# Patient Record
Sex: MALE | Race: WHITE
[De-identification: names, ages, dates, MRNs, and addresses within clinical notes are randomized per-mention and may not be internally consistent; named-entity substitution may affect disease eponyms.]

---

## 2020-07-26 ENCOUNTER — HOSPITAL ENCOUNTER (INPATIENT)
Dept: HOSPITAL 75 - ER | Age: 55
LOS: 1 days | Discharge: HOME | DRG: 282 | End: 2020-07-27
Attending: INTERNAL MEDICINE | Admitting: INTERNAL MEDICINE
Payer: COMMERCIAL

## 2020-07-26 VITALS — BODY MASS INDEX: 23.77 KG/M2 | HEIGHT: 70.08 IN | WEIGHT: 166.01 LBS

## 2020-07-26 VITALS — SYSTOLIC BLOOD PRESSURE: 141 MMHG | DIASTOLIC BLOOD PRESSURE: 92 MMHG

## 2020-07-26 VITALS — SYSTOLIC BLOOD PRESSURE: 157 MMHG | DIASTOLIC BLOOD PRESSURE: 96 MMHG

## 2020-07-26 VITALS — DIASTOLIC BLOOD PRESSURE: 111 MMHG | SYSTOLIC BLOOD PRESSURE: 151 MMHG

## 2020-07-26 VITALS — DIASTOLIC BLOOD PRESSURE: 63 MMHG | SYSTOLIC BLOOD PRESSURE: 110 MMHG

## 2020-07-26 VITALS — DIASTOLIC BLOOD PRESSURE: 74 MMHG | SYSTOLIC BLOOD PRESSURE: 126 MMHG

## 2020-07-26 VITALS — DIASTOLIC BLOOD PRESSURE: 68 MMHG | SYSTOLIC BLOOD PRESSURE: 104 MMHG

## 2020-07-26 VITALS — SYSTOLIC BLOOD PRESSURE: 139 MMHG | DIASTOLIC BLOOD PRESSURE: 87 MMHG

## 2020-07-26 DIAGNOSIS — R00.1: ICD-10-CM

## 2020-07-26 DIAGNOSIS — I21.4: Primary | ICD-10-CM

## 2020-07-26 DIAGNOSIS — Z87.891: ICD-10-CM

## 2020-07-26 LAB
ALBUMIN SERPL-MCNC: 3.9 GM/DL (ref 3.2–4.5)
ALP SERPL-CCNC: 73 U/L (ref 40–136)
ALT SERPL-CCNC: 15 U/L (ref 0–55)
APTT BLD: 29 SEC (ref 24–35)
BASOPHILS # BLD AUTO: 0.1 10^3/UL (ref 0–0.1)
BASOPHILS NFR BLD AUTO: 1 % (ref 0–10)
BILIRUB SERPL-MCNC: 0.2 MG/DL (ref 0.1–1)
BUN/CREAT SERPL: 16
CALCIUM SERPL-MCNC: 8.8 MG/DL (ref 8.5–10.1)
CHLORIDE SERPL-SCNC: 104 MMOL/L (ref 98–107)
CO2 SERPL-SCNC: 19 MMOL/L (ref 21–32)
CREAT SERPL-MCNC: 1.06 MG/DL (ref 0.6–1.3)
EOSINOPHIL # BLD AUTO: 0.1 10^3/UL (ref 0–0.3)
EOSINOPHIL NFR BLD AUTO: 1 % (ref 0–10)
ERYTHROCYTE [DISTWIDTH] IN BLOOD BY AUTOMATED COUNT: 13.3 % (ref 10–14.5)
GFR SERPLBLD BASED ON 1.73 SQ M-ARVRAT: > 60 ML/MIN
GLUCOSE SERPL-MCNC: 111 MG/DL (ref 70–105)
HCT VFR BLD CALC: 44 % (ref 40–54)
HGB BLD-MCNC: 15.7 G/DL (ref 13.3–17.7)
INR PPP: 0.9 (ref 0.8–1.4)
LYMPHOCYTES # BLD AUTO: 5.6 X 10^3 (ref 1–4)
LYMPHOCYTES NFR BLD AUTO: 60 % (ref 12–44)
MAGNESIUM SERPL-MCNC: 1.9 MG/DL (ref 1.6–2.4)
MANUAL DIFFERENTIAL PERFORMED BLD QL: NO
MCH RBC QN AUTO: 31 PG (ref 25–34)
MCHC RBC AUTO-ENTMCNC: 35 G/DL (ref 32–36)
MCV RBC AUTO: 87 FL (ref 80–99)
MONOCYTES # BLD AUTO: 0.4 X 10^3 (ref 0–1)
MONOCYTES NFR BLD AUTO: 5 % (ref 0–12)
NEUTROPHILS # BLD AUTO: 3.1 X 10^3 (ref 1.8–7.8)
NEUTROPHILS NFR BLD AUTO: 34 % (ref 42–75)
PLATELET # BLD: 180 10^3/UL (ref 130–400)
PMV BLD AUTO: 11.6 FL (ref 7.4–10.4)
POTASSIUM SERPL-SCNC: 4.4 MMOL/L (ref 3.6–5)
PROT SERPL-MCNC: 6.4 GM/DL (ref 6.4–8.2)
PROTHROMBIN TIME: 12.2 SEC (ref 12.2–14.7)
SODIUM SERPL-SCNC: 134 MMOL/L (ref 135–145)
WBC # BLD AUTO: 9.3 10^3/UL (ref 4.3–11)

## 2020-07-26 PROCEDURE — 83874 ASSAY OF MYOGLOBIN: CPT

## 2020-07-26 PROCEDURE — 93041 RHYTHM ECG TRACING: CPT

## 2020-07-26 PROCEDURE — 85610 PROTHROMBIN TIME: CPT

## 2020-07-26 PROCEDURE — 36415 COLL VENOUS BLD VENIPUNCTURE: CPT

## 2020-07-26 PROCEDURE — 85379 FIBRIN DEGRADATION QUANT: CPT

## 2020-07-26 PROCEDURE — 85730 THROMBOPLASTIN TIME PARTIAL: CPT

## 2020-07-26 PROCEDURE — 71045 X-RAY EXAM CHEST 1 VIEW: CPT

## 2020-07-26 PROCEDURE — 80048 BASIC METABOLIC PNL TOTAL CA: CPT

## 2020-07-26 PROCEDURE — 84484 ASSAY OF TROPONIN QUANT: CPT

## 2020-07-26 PROCEDURE — 83880 ASSAY OF NATRIURETIC PEPTIDE: CPT

## 2020-07-26 PROCEDURE — 93005 ELECTROCARDIOGRAM TRACING: CPT

## 2020-07-26 PROCEDURE — 80061 LIPID PANEL: CPT

## 2020-07-26 PROCEDURE — 85025 COMPLETE CBC W/AUTO DIFF WBC: CPT

## 2020-07-26 PROCEDURE — 83735 ASSAY OF MAGNESIUM: CPT

## 2020-07-26 PROCEDURE — 80053 COMPREHEN METABOLIC PANEL: CPT

## 2020-07-26 RX ADMIN — NITROGLYCERIN SCH INCH: 20 OINTMENT TOPICAL at 23:00

## 2020-07-26 RX ADMIN — SODIUM CHLORIDE SCH MLS/HR: 900 INJECTION, SOLUTION INTRAVENOUS at 21:15

## 2020-07-26 NOTE — ED GENERAL
General


Stated Complaint:  CP/L ARM PAIN/COLD SWEATS


Source of Information:  Patient


Exam Limitations:  No Limitations





History of Present Illness


Date Seen by Provider:  Jul 26, 2020


Time Seen by Provider:  17:13


Initial Comments


To ER with left upper chest pain sharp in nature that radiates down the left 

arm. No cough. No fevers. He does have shortness of breath that also started 1 

hour ago. His symptoms began while he was at rest getting ready to go to work at

[employer].  He has no fevers or cough or headache. He does not smoke.


Timing/Duration:  1-2 Days


Severity:  Moderate


Associated Systoms:  Denies Symptoms





Allergies and Home Medications


Allergies


Coded Allergies:  


     No Known Drug Allergies (Unverified , 7/26/20)





Patient Home Medication List


Home Medication List Reviewed:  Yes





Review of Systems


Review of Systems


Constitutional:  see HPI; No chills, No fever


EENTM:  see HPI


Respiratory:  see HPI, short of breath


Cardiovascular:  see HPI, chest pain


Genitourinary:  no symptoms reported


Musculoskeletal:  no symptoms reported


Skin:  no symptoms reported


Psychiatric/Neurological:  No Symptoms Reported


Hematologic/Lymphatic:  No Symptoms Reported


Immunological/Allergic:  no symptoms reported





Past Medical-Social-Family Hx


Patient Social History


Recent Foreign Travel:  No


Contact w/Someone Who Travel:  No





Physical Exam


Vital Signs





Vital Signs - First Documented








 7/26/20





 17:18


 


Temp 36.8


 


Pulse 52


 


Resp 18


 


B/P (MAP) 148/94 (112)


 


Pulse Ox 98


 


O2 Delivery Room Air





Capillary Refill :


Height, Weight, BMI


Height: '"


Weight: lbs. oz. kg;  BMI


Method:


General Appearance:  No Apparent Distress, WD/WN


Eyes:  Bilateral Eye Normal Inspection, Bilateral Eye PERRL, Bilateral Eye EOMI


HEENT:  PERRL/EOMI, TMs Normal


Neck:  Full Range of Motion, Normal Inspection


Respiratory:  Lungs Clear, Normal Breath Sounds, No Accessory Muscle Use, No 

Respiratory Distress


Cardiovascular:  Regular Rate, Rhythm, Normal Peripheral Pulses


Gastrointestinal:  Normal Bowel Sounds, Non Tender, Soft


Extremity:  Normal Capillary Refill, Normal Inspection


Neurologic/Psychiatric:  Alert, Oriented x3


Skin:  Normal Color, Warm/Dry





Progress/Results/Core Measures


Suspected Sepsis


SIRS


Temperature: 


Pulse:  


Respiratory Rate: 


 


Laboratory Tests


7/26/20 17:25: White Blood Count 9.3


Blood Pressure  / 


Mean: 


 





Laboratory Tests


7/26/20 17:25: 


Creatinine 1.06, INR Comment 0.9, Platelet Count 180, Total Bilirubin 0.2








Results/Orders


Lab Results





Laboratory Tests








Test


 7/26/20


17:25 7/26/20


19:20 Range/Units


 


 


White Blood Count


 9.3 


 


 4.3-11.0


10^3/uL


 


Red Blood Count


 5.07 


 


 4.35-5.85


10^6/uL


 


Hemoglobin 15.7   13.3-17.7  G/DL


 


Hematocrit 44   40-54  %


 


Mean Corpuscular Volume 87   80-99  FL


 


Mean Corpuscular Hemoglobin 31   25-34  PG


 


Mean Corpuscular Hemoglobin


Concent 35 


 


 32-36  G/DL





 


Red Cell Distribution Width 13.3   10.0-14.5  %


 


Platelet Count


 180 


 


 130-400


10^3/uL


 


Mean Platelet Volume 11.6 H  7.4-10.4  FL


 


Neutrophils (%) (Auto) 34 L  42-75  %


 


Lymphocytes (%) (Auto) 60 H  12-44  %


 


Monocytes (%) (Auto) 5   0-12  %


 


Eosinophils (%) (Auto) 1   0-10  %


 


Basophils (%) (Auto) 1   0-10  %


 


Neutrophils # (Auto) 3.1   1.8-7.8  X 10^3


 


Lymphocytes # (Auto) 5.6 H  1.0-4.0  X 10^3


 


Monocytes # (Auto) 0.4   0.0-1.0  X 10^3


 


Eosinophils # (Auto)


 0.1 


 


 0.0-0.3


10^3/uL


 


Basophils # (Auto)


 0.1 


 


 0.0-0.1


10^3/uL


 


Prothrombin Time 12.2   12.2-14.7  SEC


 


INR Comment 0.9   0.8-1.4  


 


Activated Partial


Thromboplast Time 29 


 


 24-35  SEC





 


D-Dimer


 0.56 H


 


 0.00-0.49


UG/ML


 


Sodium Level 134 L  135-145  MMOL/L


 


Potassium Level 4.4   3.6-5.0  MMOL/L


 


Chloride Level 104     MMOL/L


 


Carbon Dioxide Level 19 L  21-32  MMOL/L


 


Anion Gap 11   5-14  MMOL/L


 


Blood Urea Nitrogen 17   7-18  MG/DL


 


Creatinine


 1.06 


 


 0.60-1.30


MG/DL


 


Estimat Glomerular Filtration


Rate > 60 


 


  





 


BUN/Creatinine Ratio 16    


 


Glucose Level 111 H    MG/DL


 


Calcium Level 8.8   8.5-10.1  MG/DL


 


Corrected Calcium 8.9   8.5-10.1  MG/DL


 


Magnesium Level 1.9   1.6-2.4  MG/DL


 


Total Bilirubin 0.2   0.1-1.0  MG/DL


 


Aspartate Amino Transf


(AST/SGOT) 21 


 


 5-34  U/L





 


Alanine Aminotransferase


(ALT/SGPT) 15 


 


 0-55  U/L





 


Alkaline Phosphatase 73     U/L


 


Myoglobin


 84.5 


 


 10.0-92.0


NG/ML


 


Troponin I < 0.028  0.128 H <0.028  NG/ML


 


B-Type Natriuretic Peptide 29.5   <100.0  PG/ML


 


Total Protein 6.4   6.4-8.2  GM/DL


 


Albumin 3.9   3.2-4.5  GM/DL








My Orders





Orders - JAMEY BRAGG APRN


Cbc With Automated Diff (7/26/20 17:10)


Magnesium (7/26/20 17:10)


Chest 1 View, Ap/Pa Only (7/26/20 17:10)


Ekg Tracing (7/26/20 17:10)


Comprehensive Metabolic Panel (7/26/20 17:10)


Myoglobin Serum (7/26/20 17:10)


Protime With Inr (7/26/20 17:10)


Partial Thromboplastin Time (7/26/20 17:10)


O2 (7/26/20 17:10)


Monitor-Rhythm Ecg Trace Only (7/26/20 17:10)


Lipid Panel (7/27/20 06:00)


Ed Iv/Invasive Line Start (7/26/20 17:10)


BNP (7/26/20 17:10)


Fibrin Degradation Products (7/26/20 17:10)


Troponin I (7/26/20 17:10)


Aspirin Chewable Tablet (Baby Aspirin Ch (7/26/20 17:15)


Ketorolac Injection (Toradol Injection) (7/26/20 17:15)


Troponin I (7/26/20 19:12)


Enoxaparin Injection (Lovenox Injection) (7/26/20 20:15)


Clopidogrel Tablet (Plavix Tablet) (7/26/20 20:15)


Atorvastatin Tablet (Lipitor Tablet) (7/26/20 21:00)





Medications Given in ED





Current Medications








 Medications  Dose


 Ordered  Sig/Jeffry


 Route  Start Time


 Stop Time Status Last Admin


Dose Admin


 


 Aspirin  324 mg  ONCE  ONCE


 PO  7/26/20 17:15


 7/26/20 17:16 DC 7/26/20 17:36


324 MG


 


 Ketorolac


 Tromethamine  15 mg  ONCE  ONCE


 IVP  7/26/20 17:15


 7/26/20 17:16 DC 7/26/20 17:36


15 MG








Vital Signs/I&O











 7/26/20





 17:18


 


Temp 36.8


 


Pulse 52


 


Resp 18


 


B/P (MAP) 148/94 (112)


 


Pulse Ox 98


 


O2 Delivery Room Air





Capillary Refill :





Departure


Communication (Admissions)


Time/Spoke to Admitting Phy:  20:06


2006-discussed with Dr. Shields and Dr. Law, we will admit use Lovenox 

treatment dose, aspirin, Plavix bolus, statin.


1945-he is chest pain-free as he has been since receiving the Toradol. We are 

awaiting repeat troponin to come back.


2006-still completely pain-free. However his second troponin came back little 

elevated.





Impression





   Primary Impression:  


   Chest pain


   Qualified Codes:  R07.9 - Chest pain, unspecified


Disposition:  09 ADMITTED AS INPATIENT


Condition:  Stable





Admissions


Decision to Admit Reason:  Admit from ER (General)


Decision to Admit/Date:  Jul 26, 2020


Time/Decision to Admit Time:  20:06





Departure-Patient Inst.


Decision time for Depature:  19:45


Referrals:  


NO,LOCAL PHYSICIAN (PCP/Family)


Primary Care Physician


Patient Instructions:  Chest Pain (DC)





Add. Discharge Instructions:  


Follow-up with your doctor later this week for recheck. Return to ER for any 

worsening.











JAMEY BRAGG             Jul 26, 2020 17:15

## 2020-07-26 NOTE — XMS REPORT
Continuity of Care Document

                             Created on: 2020



SHAKIRA TREMAINE

External Reference #: [id number]

: 1965

Sex: Male



Demographics





                          Home Phone                [phone]

 

                          Preferred Language        Unknown

 

                          Marital Status            Unknown

 

                          Alevism Affiliation     Unknown

 

                          Race                      Unknown

 

                          Ethnic Group              Unknown





Author





                          Organization              Unknown

 

                          Address                   Unknown

 

                          Phone                     Unavailable



              



Allergies

      



There is no data.                  



Medications

      



There is no data.                  



Problems

      



There is no data.                  



Procedures

      



There is no data.                  



Results

      



                    Test                Result              Range        

 

                                        Complete blood count (CBC) with automate

d white blood cell (WBC) differential - 

20 17:25         

 

                          Blood leukocytes automated count (number/volume)      

     9.3 10*3/uL          

                                        4.3-11.0        

 

                          Blood erythrocytes automated count (number/volume)    

       5.07 10*6/uL       

                                        4.35-5.85        

 

                    Venous blood hemoglobin measurement (mass/volume)           

15.7 g/dL           

13.3-17.7        

 

                    Blood hematocrit (volume fraction)           44 %           

     40-54        

 

                    Automated erythrocyte mean corpuscular volume           87 [

foz_us]           

80-99        

 

                                        Automated erythrocyte mean corpuscular h

emoglobin (mass per erythrocyte)        

                          31 pg                     25-34        

 

                                        Automated erythrocyte mean corpuscular h

emoglobin concentration measurement 

(mass/volume)             35 g/dL                   32-36        

 

                    Automated erythrocyte distribution width ratio           13.

3 %              10.0-

14.5        

 

                    Automated blood platelet count (count/volume)           180 

10*3/uL           

130-400        

 

                          Automated blood platelet mean volume measurement      

     11.6 [foz_us]        

                                        7.4-10.4        

 

                    Automated blood neutrophils/100 leukocytes           34 %   

             42-75       

 

 

                    Automated blood lymphocytes/100 leukocytes           60 %   

             12-44       

 

 

                    Blood monocytes/100 leukocytes           5 %                

 0-12        

 

                    Automated blood eosinophils/100 leukocytes           1 %    

             0-10        

 

                    Automated blood basophils/100 leukocytes           1 %      

           0-10        

 

                    Blood neutrophils automated count (number/volume)           

3.1 10*3            

1.8-7.8        

 

                    Blood lymphocytes automated count (number/volume)           

5.6 10*3            

1.0-4.0        

 

                    Blood monocytes automated count (number/volume)           0.

4 10*3            

0.0-1.0        

 

                    Automated eosinophil count           0.1 10*3/uL           0

.0-0.3        

 

                    Automated blood basophil count (count/volume)           0.1 

10*3/uL           

0.0-0.1        

 

                                        PT panel in platelet poor plasma by coag

ulation assay - 20 17:25         

 

                          Prothrombin time (PT) in platelet poor plasma by coagu

lation assay           

12.2 s                                  12.2-14.7        

 

                          INR in platelet poor plasma or blood by coagulation as

say           0.9         

                                        0.8-1.4        

 

                                        Activated partial thromboplastin time (a

PTT) in platelet poor plasma 

bycoagulation assay - 20 17:25         

 

                                        Activated partial thromboplastin time (a

PTT) in platelet poor plasma 

bycoagulation assay           29 s                      24-35        

 

                                        Comprehensive metabolic panel - 20

 17:25         

 

                          Serum or plasma sodium measurement (moles/volume)     

      134 mmol/L          

                                        135-145        

 

                          Serum or plasma potassium measurement (moles/volume)  

         4.4 mmol/L       

                                        3.6-5.0        

 

                          Serum or plasma chloride measurement (moles/volume)   

        104 mmol/L        

                                                

 

                    Carbon dioxide           19 mmol/L           21-32        

 

                          Serum or plasma anion gap determination (moles/volume)

           11 mmol/L      

                                        5-14        

 

                          Serum or plasma urea nitrogen measurement (mass/volume

)           17 mg/dL      

                                        7-18        

 

                          Serum or plasma creatinine measurement (mass/volume)  

         1.06 mg/dL       

                                        0.60-1.30        

 

                    Serum or plasma urea nitrogen/creatinine mass ratio         

  16                  NRG 

       

 

                                        Serum or plasma creatinine measurement w

ith calculation of estimated glomerular 

filtration rate           >                         NRG        

 

                    Serum or plasma glucose measurement (mass/volume)           

111 mg/dL           

        

 

                    Serum or plasma calcium measurement (mass/volume)           

8.8 mg/dL           

8.5-10.1        

 

                          Serum or plasma total bilirubin measurement (mass/volu

me)           0.2 mg/dL   

                                        0.1-1.0        

 

                                        Serum or plasma alkaline phosphatase tasha

surement (enzymatic activity/volume)    

                          73 U/L                            

 

                                        Serum or plasma aspartate aminotransfera

se measurement (enzymatic 

activity/volume)           21 U/L                    5-34        

 

                                        Serum or plasma alanine aminotransferase

 measurement (enzymatic activity/volume)

                          15 U/L                    0-55        

 

                    Serum or plasma protein measurement (mass/volume)           

6.4 g/dL            

6.4-8.2        

 

                    Serum or plasma albumin measurement (mass/volume)           

3.9 g/dL            

3.2-4.5        

 

                    CALCIUM CORRECTED           8.9 mg/dL           8.5-10.1    

    

 

                                        Fibrin D-dimer FEU measurement in platel

et poor plasma (mass/volume) - 20 

17:25         

 

                          Fibrin D-dimer FEU measurement in platelet poor plasma

 (mass/volume)           

0.56 ug/mL                              0.00-0.49        

 

                                        Magnesium - 20 17:25         

 

                    Magnesium           1.9 mg/dL           1.6-2.4        

 

                                        Myoglobin, serum - 20 17:25       

  

 

                    Myoglobin, serum           84.5 ng/mL           10.0-92.0   

     

 

                                        Serum or plasma lithium measurement (mol

es/volume) - 20 17:25         

 

                    BNP PT              29.5 pg/mL           <100.0        

 

                                        Serum or plasma troponin i.cardiac measu

rement (mass/volume) - 20 17:25   

      

 

                          Serum or plasma troponin i.cardiac measurement (mass/v

olume)           < ng/mL  

                                        <0.028        

 

                                        Serum or plasma troponin i.cardiac measu

rement (mass/volume) - 20 19:20   

      

 

                          Serum or plasma troponin i.cardiac measurement (mass/v

olume)           0.128 

ng/mL                                   <0.028        



                                  



Encounters

      



                ACCT No.           Visit Date/Time           Discharge          

 Status         

             Pt. Type           Provider           Facility           Loc./Unit 

          

Complaint        

 

             [id number]           2020 17:36:00                         

            

Document Registration

## 2020-07-26 NOTE — DIAGNOSTIC IMAGING REPORT
INDICATION: Chest pain.



EXAMINATION: Single AP view of the chest was obtained.



COMPARISON: No previous study is available for comparison at this

time.



FINDINGS: Heart size and pulmonary vasculature are within normal

limits, and the lungs are clear, bilaterally.  



IMPRESSION: Unremarkable chest.   



Dictated by: 



  Dictated on workstation # [id number]

## 2020-07-27 VITALS — DIASTOLIC BLOOD PRESSURE: 76 MMHG | SYSTOLIC BLOOD PRESSURE: 108 MMHG

## 2020-07-27 VITALS — DIASTOLIC BLOOD PRESSURE: 75 MMHG | SYSTOLIC BLOOD PRESSURE: 113 MMHG

## 2020-07-27 VITALS — SYSTOLIC BLOOD PRESSURE: 108 MMHG | DIASTOLIC BLOOD PRESSURE: 76 MMHG

## 2020-07-27 VITALS — DIASTOLIC BLOOD PRESSURE: 77 MMHG | SYSTOLIC BLOOD PRESSURE: 105 MMHG

## 2020-07-27 VITALS — DIASTOLIC BLOOD PRESSURE: 70 MMHG | SYSTOLIC BLOOD PRESSURE: 94 MMHG

## 2020-07-27 VITALS — SYSTOLIC BLOOD PRESSURE: 114 MMHG | DIASTOLIC BLOOD PRESSURE: 80 MMHG

## 2020-07-27 VITALS — DIASTOLIC BLOOD PRESSURE: 84 MMHG | SYSTOLIC BLOOD PRESSURE: 113 MMHG

## 2020-07-27 VITALS — DIASTOLIC BLOOD PRESSURE: 71 MMHG | SYSTOLIC BLOOD PRESSURE: 111 MMHG

## 2020-07-27 VITALS — SYSTOLIC BLOOD PRESSURE: 103 MMHG | DIASTOLIC BLOOD PRESSURE: 71 MMHG

## 2020-07-27 VITALS — DIASTOLIC BLOOD PRESSURE: 80 MMHG | SYSTOLIC BLOOD PRESSURE: 117 MMHG

## 2020-07-27 VITALS — DIASTOLIC BLOOD PRESSURE: 79 MMHG | SYSTOLIC BLOOD PRESSURE: 109 MMHG

## 2020-07-27 LAB
BASOPHILS # BLD AUTO: 0 10^3/UL (ref 0–0.1)
BASOPHILS NFR BLD AUTO: 0 % (ref 0–10)
BUN/CREAT SERPL: 15
CALCIUM SERPL-MCNC: 8.2 MG/DL (ref 8.5–10.1)
CHLORIDE SERPL-SCNC: 106 MMOL/L (ref 98–107)
CHOLEST SERPL-MCNC: 125 MG/DL (ref ?–200)
CO2 SERPL-SCNC: 22 MMOL/L (ref 21–32)
CREAT SERPL-MCNC: 1.02 MG/DL (ref 0.6–1.3)
EOSINOPHIL # BLD AUTO: 0.1 10^3/UL (ref 0–0.3)
EOSINOPHIL NFR BLD AUTO: 1 % (ref 0–10)
ERYTHROCYTE [DISTWIDTH] IN BLOOD BY AUTOMATED COUNT: 13.2 % (ref 10–14.5)
GFR SERPLBLD BASED ON 1.73 SQ M-ARVRAT: > 60 ML/MIN
GLUCOSE SERPL-MCNC: 102 MG/DL (ref 70–105)
HCT VFR BLD CALC: 41 % (ref 40–54)
HDLC SERPL-MCNC: 39 MG/DL (ref 40–60)
HGB BLD-MCNC: 14.3 G/DL (ref 13.3–17.7)
LYMPHOCYTES # BLD AUTO: 5.2 X 10^3 (ref 1–4)
LYMPHOCYTES NFR BLD AUTO: 55 % (ref 12–44)
MANUAL DIFFERENTIAL PERFORMED BLD QL: NO
MCH RBC QN AUTO: 31 PG (ref 25–34)
MCHC RBC AUTO-ENTMCNC: 35 G/DL (ref 32–36)
MCV RBC AUTO: 88 FL (ref 80–99)
MONOCYTES # BLD AUTO: 0.5 X 10^3 (ref 0–1)
MONOCYTES NFR BLD AUTO: 5 % (ref 0–12)
NEUTROPHILS # BLD AUTO: 3.6 X 10^3 (ref 1.8–7.8)
NEUTROPHILS NFR BLD AUTO: 38 % (ref 42–75)
PLATELET # BLD: 165 10^3/UL (ref 130–400)
PMV BLD AUTO: 11.5 FL (ref 7.4–10.4)
POTASSIUM SERPL-SCNC: 4.4 MMOL/L (ref 3.6–5)
SODIUM SERPL-SCNC: 136 MMOL/L (ref 135–145)
TRIGL SERPL-MCNC: 146 MG/DL (ref ?–150)
VLDLC SERPL CALC-MCNC: 29 MG/DL (ref 5–40)
WBC # BLD AUTO: 9.4 10^3/UL (ref 4.3–11)

## 2020-07-27 RX ADMIN — SODIUM CHLORIDE SCH MLS/HR: 900 INJECTION, SOLUTION INTRAVENOUS at 07:11

## 2020-07-27 RX ADMIN — NITROGLYCERIN SCH INCH: 20 OINTMENT TOPICAL at 06:21

## 2020-07-27 NOTE — DISCHARGE INST-SIMPLE/STANDARD
Discharge Inst-Standard


Discharge Medications


New, Converted or Re-Newed RX:  Transmitted to Pharmacy





Patient Instructions/Follow Up


Plan of Care/Instructions/FU:  


Please continue to take your medications as written. Please follow up with


a primary care doctor and with Dr Law.


Activity as Tolerated:  Yes


Discharge Diet:  Cardiac Diet


Return to The Hospital For:  


Chest pain, shortness of breath, fever, palpitations, if you feel you are


getting worse.











MONICA FRANK MD              Jul 27, 2020 11:00

## 2020-07-27 NOTE — CONSULTATION-CARDIOLOGY
HPI-Cardiology


Cardiology Consultation:


Date of Consultation


20


Time Seen by a Provider:  07:50


Date of Admission





Attending Physician


Amara Shields MD


Admitting Physician


No,Local Physician


Consulting Physician


RUBINA DE LEON MD, FACP, FACC





HPI:


Chief Complaint:





CC: Chest discomfort








HPI


55 yo man [place] [facility] 20 with 

midsternal, sharp chest pain that radiated to the L shoulder and was associated 

with some diaphoresis and shortness of breath.  It was relieved in ER with 

Toradol. Chest pain did not recur but did have transient L shoulder discomfort 

during the night. No symptoms this morning. Denies fever of chills. Has never 

experienced such chest discomfort before. Denies shortness of breath or leg 

swelling or cough





Review of Systems-Cardiology


Review of Systems


Constitutional:  No malaise, No tiredness, No weight loss, No weight gain


Eyes:  No vision change


Ears/Nose/Throat:  No ear discharge, No nasal drainage, No recent hearing loss


Respiratory:  As described under HPI


Cardiovascular:  As described under HPI


Gastrointestinal:  No constipation, No diarrhea, No vomiting


Genitourinary:  No dysuria, No hematuria, No urine frequency changes


Musculoskeletal:  No back pain, No joint pain


Skin:  No rash, No ulcerations


Psychiatric/Neurological:  No seizure, No focal weakness, No syncope


Hematologic:  No bleeding abnormalities





PMH-Social-Family Hx


Patient Social History


Employed/Student:  employed


Alcohol Use:  Denies Use


Recreational Drug Use:  No


Smoking Status:  Former Smoker (quite )


Recent Foreign Travel:  No


Recent Infectious Disease Expo:  No


Hospitalization with Isolation:  Denies





Past Medical History


PMH


As described under Assessment.





Family Medical History


Family Medical History:  


Does not report fam h/o early CAD





Allergies and Home Medications


Allergies


Coded Allergies:  


     No Known Drug Allergies (Unverified , 20)





Home Medications


Aspirin 81 Mg Tab.chew, 81 MG PO DAILY@0900


   Prescribed by: MONICA POWER on 20 08


Atorvastatin Calcium 20 Mg Tablet, 40 MG PO HS


   Prescribed by: MONICA POWER on 20


Clopidogrel Bisulfate 75 Mg Tablet, 75 MG PO DAILY


   Prescribed by: MONICA POWER on 20





Patient Home Medication List


Home Medication List Reviewed:  Yes





Physical Exam-Cardiology


Physical Exam


Vital Signs/I&O











 20





 21:45 21:59 22:00 22:30


 


Pulse   44 46


 


B/P (MAP) 157/96 (116)  126/74 (91) 110/63 (79)


 


Pulse Ox 100  99 100


 


O2 Delivery Room Air Room Air Room Air Room Air





 20





 23:00 23:08 00:00 00:00


 


Temp    36.8


 


Pulse 69   


 


B/P (MAP) 104/68 (80)   


 


Pulse Ox 100 100  


 


O2 Delivery Room Air Room Air Room Air 


 


    





 20





 00:00 01:00 01:00 02:00


 


Pulse 47 58 58 54


 


B/P (MAP) 94/70 (78)  113/75 (88) 113/84 (94)


 


Pulse Ox 99  99 99


 


O2 Delivery Room Air  Room Air Room Air





 20





 03:00 04:00 04:00 04:00


 


Temp   36.9 


 


Pulse 53   50


 


B/P (MAP) 108/76 (87)   105/77 (86)


 


Pulse Ox 98   99


 


O2 Delivery Room Air Room Air  Room Air


 


    





 20





 05:00 07:00 07:00 08:00


 


Pulse 54 52 51 


 


B/P (MAP) 103/71 (82)  108/76 (87) 


 


Pulse Ox 97  100 


 


O2 Delivery Room Air  Room Air Room Air





 20  





 08:00 09:28  


 


Pulse 61   


 


B/P (MAP) 111/71 (84)   


 


Pulse Ox 100 100  


 


O2 Delivery Room Air Room Air  














 20





 00:00


 


Intake Total 0 ml


 


Balance 0 ml





Capillary Refill : NONE


Constitutional:  AAO x 3, well-developed, well-nourished


HEENT:  PERRL, EOMI, hearing is well preserved; No xanthelasmas are seen


Neck:  carotid pulses are 2 + bilaterally, with good upstrokes


Respiratory:  No accessory muscle use; other (good, bilateral air entry)


Cardiovascular:  regular rate-rhythm, S1 and S2


Gastrointestinal:  No tender; soft; No guarding, No rebound; audible bowel 

sounds


Extremities:  No clubbing, No cyanosis, No significant edema


Neurologic/Psychiatric:  oriented x 3, other (moves all limbs equally)


Skin:  No rash on exposed areas, No ulcerations on exposed areas





Data Review


Labs


Laboratory Tests


20 17:25: 


White Blood Count 9.3, Red Blood Count 5.07, Hemoglobin 15.7, Hematocrit 44, 

Mean Corpuscular Volume 87, Mean Corpuscular Hemoglobin 31, Mean Corpuscular 

Hemoglobin Concent 35, Red Cell Distribution Width 13.3, Platelet Count 180, 

Mean Platelet Volume 11.6H, Neutrophils (%) (Auto) 34L, Lymphocytes (%) (Auto) 

60H, Monocytes (%) (Auto) 5, Eosinophils (%) (Auto) 1, Basophils (%) (Auto) 1, 

Neutrophils # (Auto) 3.1, Lymphocytes # (Auto) 5.6H, Monocytes # (Auto) 0.4, 

Eosinophils # (Auto) 0.1, Basophils # (Auto) 0.1, Prothrombin Time 12.2, INR 

Comment 0.9, Activated Partial Thromboplast Time 29, D-Dimer 0.56H, Sodium Level

134L, Potassium Level 4.4, Chloride Level 104, Carbon Dioxide Level 19L, Anion 

Gap 11, Blood Urea Nitrogen 17, Creatinine 1.06, Estimat Glomerular Filtration 

Rate > 60, BUN/Creatinine Ratio 16, Glucose Level 111H, Calcium Level 8.8, 

Corrected Calcium 8.9, Magnesium Level 1.9, Total Bilirubin 0.2, Aspartate Amino

Transf (AST/SGOT) 21, Alanine Aminotransferase (ALT/SGPT) 15, Alkaline 

Phosphatase 73, Myoglobin 84.5, Troponin I < 0.028, B-Type Natriuretic Peptide 

29.5, Total Protein 6.4, Albumin 3.9


20 19:20: Troponin I 0.128H


20 00:14: Troponin I 0.537*H


20 04:57: 


White Blood Count 9.4, Red Blood Count 4.67, Hemoglobin 14.3, Hematocrit 41, 

Mean Corpuscular Volume 88, Mean Corpuscular Hemoglobin 31, Mean Corpuscular 

Hemoglobin Concent 35, Red Cell Distribution Width 13.2, Platelet Count 165, 

Mean Platelet Volume 11.5H, Neutrophils (%) (Auto) 38L, Lymphocytes (%) (Auto) 

55H, Monocytes (%) (Auto) 5, Eosinophils (%) (Auto) 1, Basophils (%) (Auto) 0, 

Neutrophils # (Auto) 3.6, Lymphocytes # (Auto) 5.2H, Monocytes # (Auto) 0.5, 

Eosinophils # (Auto) 0.1, Basophils # (Auto) 0.0, Sodium Level 136, Potassium 

Level 4.4, Chloride Level 106, Carbon Dioxide Level 22, Anion Gap 8, Blood Urea 

Nitrogen 15, Creatinine 1.02, Estimat Glomerular Filtration Rate > 60, 

BUN/Creatinine Ratio 15, Glucose Level 102, Calcium Level 8.2L, Triglycerides 

Level 146, Cholesterol Level 125, LDL Cholesterol Direct 72, VLDL Cholesterol 

29, HDL Cholesterol 39L





Laboratory Tests


20 17:25








20 04:57











A/P-Cardiology


Assessment/Admission Diagnosis





Ac NSTEMI





Sinus bradycardia, precludes use of beta-blocker





Quit smoking in or around 





Discussion and Recomendations





* Symptoms, ECG and labs are consistent with ac NSTEMI.  I had a long and 

  detailed discussion with him and explained the diagnosis to him and answered 

  questions. Given NSTEMI and recurrent symptoms last night, card cath is 

  recommended. I discussed the rationale, procedure, risk, benefits, potential 

  complications and alternatives of card cath and possible ad hoc coronary 

  intervention with him and answered questions. He understands all of the issue 

  and refuses invasive management. Wishes to leave and does not wish to stay in 

  the hospital any longer. Does seem to agree to taking meds. Is on DAPT and 

  statins that were initiated during this hospitalization (was taking no meds at

  home). Cannot give beta-blocker because has demonstrated intermittent, 

  significant sinus bradycardia during hospitalization (albeit asymptomatic) 

  with resting heart rate in the low forties and high thirties


* Outpatient f/u is advised


* We have advised him to continue to refrain from tobacco use


* I discussed his case in detail with Dr Power this am





Clinical Quality Measures


AMI/AHF:


ASA po Prior to arrival:  No





DVT/VTE Risk/Contraindication:


Risk Factor Score Per Nursin


RFS Level Per Nursing on Admit:  2=Moderate











RUBINA DE LEON MD [facility]   2020 09:44

## 2020-07-27 NOTE — SHORT STAY SUMMARY-HOSPITALIST
History of Present Illness


HPI/Chief Complaint


Pt is a 54yoCM with no diagnosed PMH who presented to the Er due to chest pain. 

He states it started at rest just prior to going to work. He states it radiated 

to his left arm and he was SOB. Original troponin was negative but repeat 2 

hours later trended up and he was admitted for observation. He described it as 

sharp and was given toradol in the ER which made it go away. With increasing 

troponin was was given Lovenox, Plavix and ASA. He has done well overnight. 

Despite this treatment though his troponin continues to rise. I examined him 

while Dr Law was at the bedside discussing cardiac cath or conservative 

management. Cath was recommended and all risks and benefits were discussed with 

the patient. He declined cardiac cath and would like to discharge home because 

he states he "doesn't like doctors."


Source:  patient


Date Seen


20


Time Seen by a Provider:  08:09


Attending Physician


Amara Shields MD


PCP


No,Local Physician


Referring Physician





Date of Admission


2020 at 20:05





Home Medications & Allergies


Home Medications


Reviewed patient Home Medication Reconciliation performed by pharmacy medication

reconciliations technician and/or nursing.


Patients Allergies have been reviewed.





Allergies





Allergies


Coded Allergies


  No Known Drug Allergies (Unverified20)








Past Medical-Social-Family Hx


Past Med/Social Hx:  Reviewed Nursing Past Med/Soc Hx


Patient Social History


Employed/Student:  employed


Alcohol Use:  Denies Use


Recreational Drug Use:  No


Smoking Status:  Former Smoker (quite )


Recent Foreign Travel:  No


Contact w/other who traveled:  No


Recent Infectious Disease Expo:  No





Family History


Reviewed Nursing Family Hx


No Pertinent Family Hx





Review of Systems


Constitutional:  No chills, No fever


EENTM:  no symptoms reported


Respiratory:  see HPI, short of breath


Cardiovascular:  see HPI, chest pain; No edema, No Hx of Intervention, No 

palpitations, No syncope


Gastrointestinal:  No abdominal pain, No constipation, No nausea, No vomiting


Genitourinary:  No dysuria, No incontinence


Musculoskeletal:  muscle cramps


Skin:  no symptoms reported


Psychiatric/Neurological:  No Symptoms Reported





Physical Exam


Physical Exam


Vital Signs





Vital Signs - First Documented








 20





 17:18


 


Temp 36.8


 


Pulse 52


 


Resp 18


 


B/P (MAP) 148/94 (112)


 


Pulse Ox 98





Capillary Refill : Less Than 3 Seconds


Height, Weight, BMI


Height: '"


Weight: lbs. oz. kg; 23.38 BMI


Method:


General Appearance:  No Apparent Distress, WD/WN, Thin


Eyes:  Bilateral Eye Normal Inspection, Bilateral Eye PERRL, Bilateral Eye EOMI


HEENT:  PERRL/EOMI, Moist Mucous Membranes; No Scleral Icterus (L), No Scleral 

Icterus (R); Other (poor dentition)


Neck:  Normal Inspection, Supple


Respiratory:  Lungs Clear, No Accessory Muscle Use, No Respiratory Distress


Cardiovascular:  Regular Rate, Rhythm, No JVD, No Murmur


Gastrointestinal:  Normal Bowel Sounds, Non Tender, Soft; No Distended, No 

Guarding


Extremity:  Normal Inspection, Non Tender; No Swelling


Neurologic/Psychiatric:  Alert, Oriented x3, Normal Mood/Affect


Skin:  Normal Color, Warm/Dry





Results


Results/Procedures


Labs


Laboratory Tests


20 17:25








20 04:57








Patient resulted labs reviewed.


Imaging


                 [facility].


                                [place], [place]





NAME:   TREMAINE ROSENBERG


[facility] REC#:   [id number]


ACCOUNT#:   [id number]


PT STATUS:   REG ER


:   1965


PHYSICIAN:   JAMEY BRAGG


ADMIT DATE:   20/ER


                                  ***Signed***


Date of Exam:20





CHEST 1 VIEW, AP/PA ONLY








INDICATION: Chest pain.





EXAMINATION: Single AP view of the chest was obtained.





COMPARISON: No previous study is available for comparison at this


time.





FINDINGS: Heart size and pulmonary vasculature are within normal


limits, and the lungs are clear, bilaterally.  





IMPRESSION: Unremarkable chest.   





Dictated by: 





  Dictated on workstation # [id number]








Dict:   20


Trans:   20


[facility] [id number]





Interpreted by:     RENEE MCFARLANE MD


Electronically signed by: RENEE MCFARLANE MD 20





Short Stay Diagnosis


Discharge Diagnosis-Short Stay


Admission Diagnosis


NSTEMI


Final Discharge Diagnosis


NSTEMI





Conclusion


Plan


NSTEMI


   Chest pain now resolved but troponin trending up


   Continue on ASA, Plavix, and statin


   Cardiology consulted, appreciate recs


   Cardiac Cath recommended, pt declined


      I personally discussed with patient the risk of worsening disease that 

could result in significant heart damage and debility and even death, he 

expressed understanding


   Advised to ambulate first and make sure no recurrence of chest pain


   Will likely DC home this afternoon if remains chest pain free





Clinical Quality Measures


AMI/AHF:


ASA po Prior to arrival:  No





DVT/VTE Risk/Contraindication:


Risk Factor Score Per Nursin


RFS Level Per Nursing on Admit:  2=Moderate











MONICA FRANK MD              2020 08:15